# Patient Record
(demographics unavailable — no encounter records)

---

## 2024-12-31 NOTE — HISTORY OF PRESENT ILLNESS
[Patient reported PAP Smear was normal] : Patient reported PAP Smear was normal [Y] : Positive pregnancy history [Irregular Menstrual Interval] : irregular menstrual interval [Currently Active] : currently active [Men] : men [Vaginal] : vaginal [No] : No [TextBox_4] : 38-year-old  5 para 3 LMP: 2024, presents for an IUD check and PCOS discussion. She was previously followed by another gynecologist for her PCOS. In addition, she had a Mirena IUD replaced on 2024. An IUD inserted prior to this procedure was removed secondary to malpositioning. She also reports having irregular menses, weight gain, and hair loss. A menses on 2024, lasted for 10 days. Before her menses in November, she hasn't gotten one for 6 years. [PapSmeardate] : 11/2024 [LMPDate] : 11/30/2024 [MensesLength] : 10 [PGHxTotal] : 5 [[facility] : [facility] [[facility]] : 3 [PGHxABSpont] : 1 [PGHxMultBirths] : 1 [FreeTextEntry1] : 11/30/2024 [FreeTextEntry3] : Mirena IUD

## 2024-12-31 NOTE — PLAN
[FreeTextEntry1] : 38-year-old  4 para 3 LMP: 2024, presents for an IUD check.She is status post insertion of Mirena IUD on 2024.  An IUD had been placed 1 week prior to this insertion which had to be removed secondary to now of positioning. Transvaginal pelvic ultrasound confirms malpositioning of the intrauterine device.  The patient was counseled that although exemplary positioning of a progesterone IUD is not required for efficacy.  Because this IUD is very much malpositioned it should be removed.  Alternative options for contraception were reviewed.  The patient states that she experienced severe mood swings and weight gain with hormonal contraception.  She also states that she experienced side effects with the progesterone subdermal implant.  She strongly desires another attempt with the IUD insertion, since she has a prior history of an IUD which was inserted in  without difficulty.   Previous history of polycystic ovarian syndrome.  The patient states that this diagnosis was based on a history of irregular menses and hormonal studies.  She presently notes significant weight gain and alopecia.  Reviewed the pathophysiology and potential complications of polycystic ovarian syndrome.  The plan is to obtain hormonal studies and fasting blood work.  Consider treating her with metformin pending results of blood work.  Family history of breast cancer, skin cancer, and colon cancer. Information was given on genetic testing.  Recommendations: Dermatological and dental examinations.  Status post ophthalmological examination earlier this year.

## 2024-12-31 NOTE — PHYSICAL EXAM
[Chaperone Present] : A chaperone was present in the examining room during all aspects of the physical examination [Examination Of The Breasts] : a normal appearance [No Discharge] : no discharge [No Masses] : no breast masses were palpable [Labia Majora] : normal [Labia Minora] : normal [Scant] : There was scant vaginal bleeding [Normal] : normal [Normal Position] : in a normal position [Uterine Adnexae] : normal [FreeTextEntry2] : Appropriately responsive, alert, and no acute distress. [FreeTextEntry3] : Thyroid is non-enlarged, nontender. No palpable nodules or goiter. No lymphadenopathy. [FreeTextEntry7] : Soft. Nondistended. Nontender. No rebound or guarding. There are no palpable masses. [FreeTextEntry1] : External genitalia are within normal limits with no lesions visualized. [FreeTextEntry4] : Scant amount of brown blood and white discharge noted within the vaginal vault. No lesions noted. [FreeTextEntry5] : A speculum was inserted without any difficulty. The cervix was normal in appearance. IUD strings were not visualized. No cervical motion tenderness. [FreeTextEntry6] : Bimanual examination was notable for a normal, nontender uterus. There were no palpable adnexal masses or adnexal tenderness.

## 2024-12-31 NOTE — REVIEW OF SYSTEMS
[Patient Intake Form Reviewed] : Patient intake form was reviewed [FreeTextEntry2] : +50 lb weight gain within one year [FreeTextEntry4] : Alopecia [FreeTextEntry8] : Irregular menses [de-identified] : hair loss

## 2025-02-13 NOTE — ADDENDUM
[FreeTextEntry1] :   Documented by Carlos Hess acting as scribe for Dr. Paiz on 02/13/2025. All Medical record entries made by the Scribe were at my, Dr. Paiz, direction and personally dictated by me on 02/13/2025 . I have reviewed the chart and agree that the record accurately reflects my personal performance of the history, physical exam, assessment and plan. I have also personally directed, reviewed, and agreed with the discharge instructions.

## 2025-02-13 NOTE — CONSULT LETTER
[Dear  ___] : Dear  [unfilled], [Consult Letter:] : I had the pleasure of evaluating your patient, [unfilled]. [Please see my note below.] : Please see my note below. [Consult Closing:] : Thank you very much for allowing me to participate in the care of this patient.  If you have any questions, please do not hesitate to contact me. [Sincerely,] : Sincerely, [FreeTextEntry3] : Rolan Paiz MD, PhD Chief, Division of Laryngology Department of Otolaryngology [facility] Pediatric Otolaryngology, [facility]  of Otolaryngology [facility]

## 2025-02-13 NOTE — HISTORY OF PRESENT ILLNESS
[de-identified] : 38 year old female presents for evaluation of muffled hearing. ++Rheum Currently with right muffled hearing and aural fullness. Occasional left otalgia and left tinnitus. States sick 3 weeks with lots of nasal congestion and mucus. Swabs negative. History of deviated nasal septum. Snoring at night - wakes up occasionally from apneas. 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 Having an auto-immune disease workup. Globus sensation, infrequent heartburn

## 2025-06-04 NOTE — PLAN
[FreeTextEntry1] : Multiparous female status post insertion of Mirena intrauterine device January 30, 2025, presents with complaints of 15 pound weight gain and mood swings since insertion.  The IUD was removed.  The patient was counseled regarding alternative forms of contraception, including a low-dose oral contraceptive pill, the vaginal ring (nuvaring), transdermal patch (xulane), progesterone intramuscular injections (depo-provera), and the progesterone subdermal implant (nexplanon).  The patient elects to use the basal body temperature method for contraception, despite a higher failure rate.  Dysuria.  Plan: Urinalysis and urine culture. Leukorrhea, status post p.o. antibiotics.  Plan: Affirm testing to rule out vaginitis.

## 2025-06-04 NOTE — REVIEW OF SYSTEMS
[FreeTextEntry2] : 15 pound weight gain since insertion of Mirena intrauterine device January 30, 2025 [FreeTextEntry8] : Dysuria.  Vaginal discharge. [de-identified] : Mood swings status post insertion of Mirena intrauterine device January 3, 2025

## 2025-06-04 NOTE — PHYSICAL EXAM
[Appropriately responsive] : appropriately responsive [Alert] : alert [No Acute Distress] : no acute distress [FreeTextEntry7] : Soft.  Nondistended.  Nontender.  No rebound or guarding.  No palpable masses. [Labia Majora] : normal [Labia Minora] : normal [Normal] : normal [FreeTextEntry1] : No lesions. [FreeTextEntry4] : Speculum semination is notable for normal-appearing cervix the IUD strings were well-visualized.  A scant amount of mucousy beige discharge was noted.  Affirm testing was performed.  Blood [FreeTextEntry5] : The cervix is normal in appearance.  The IUD strings were well-visualized.  The strings were grasped with a ring forceps, and the IUD was removed without difficulty.  The IUD was sent to microbiology.  There is no cervical motion tenderness. [FreeTextEntry6] : Bimanual examination is notable for a normal size nontender uterus.  There were no palpable adnexal masses or adnexal tenderness.

## 2025-06-04 NOTE — REVIEW OF SYSTEMS
[FreeTextEntry2] : 15 pound weight gain since insertion of Mirena intrauterine device January 30, 2025 [FreeTextEntry8] : Dysuria.  Vaginal discharge. [de-identified] : Mood swings status post insertion of Mirena intrauterine device January 3, 2025

## 2025-06-04 NOTE — REVIEW OF SYSTEMS
[FreeTextEntry2] : 15 pound weight gain since insertion of Mirena intrauterine device January 30, 2025 [FreeTextEntry8] : Dysuria.  Vaginal discharge. [de-identified] : Mood swings status post insertion of Mirena intrauterine device January 3, 2025

## 2025-06-04 NOTE — HISTORY OF PRESENT ILLNESS
[TextBox_4] : 38-year-old  5 para 3 LMP 2025 presents complaining of a 10 to 15 pound weight gain and mood swings since insertion of Mirena intrauterine device January 3, 2025.  The patient requests removal of the Mirena intrauterine device.  In addition, she complains of burning on urination and vaginal discharge.  She is status post p.o. antibiotics approximately 2-1/2 weeks ago.

## 2025-06-20 NOTE — ADDENDUM
[FreeTextEntry1] : Documented by Fernando Blackman acting as scribe for Dr. Paiz on 06/20/2025. All Medical record entries made by the Scribe were at my, Dr. Paiz, direction and personally dictated by me on 06/20/2025 . I have reviewed the chart and agree that the record accurately reflects my personal performance of the history, physical exam, assessment and plan. I have also personally directed, reviewed, and agreed with the discharge instructions.

## 2025-06-20 NOTE — CONSULT LETTER
[Dear  ___] : Dear  [unfilled], [Consult Letter:] : I had the pleasure of evaluating your patient, [unfilled]. [Please see my note below.] : Please see my note below. [Consult Closing:] : Thank you very much for allowing me to participate in the care of this patient.  If you have any questions, please do not hesitate to contact me. [Sincerely,] : Sincerely, [FreeTextEntry3] : Rolan Paiz MD, PhD Chief, Division of Laryngology Department of Otolaryngology [facility] Pediatric Otolaryngology, [facility]  of Otolaryngology [facility]

## 2025-06-20 NOTE — HISTORY OF PRESENT ILLNESS
[de-identified] : 38yF with enlarged tonsils and nasal obstruction. Now s/p tonsillectomy adenoidectomy, septoplasty, inferior Pt is doing well Denies any bleeding.